# Patient Record
Sex: MALE
[De-identification: names, ages, dates, MRNs, and addresses within clinical notes are randomized per-mention and may not be internally consistent; named-entity substitution may affect disease eponyms.]

---

## 2020-09-17 ENCOUNTER — RESULT REVIEW (OUTPATIENT)
Age: 40
End: 2020-09-17

## 2020-09-17 ENCOUNTER — OUTPATIENT (OUTPATIENT)
Dept: OUTPATIENT SERVICES | Facility: HOSPITAL | Age: 40
LOS: 1 days | End: 2020-09-17
Payer: COMMERCIAL

## 2020-09-17 ENCOUNTER — APPOINTMENT (OUTPATIENT)
Dept: CT IMAGING | Facility: IMAGING CENTER | Age: 40
End: 2020-09-17
Payer: COMMERCIAL

## 2020-09-17 ENCOUNTER — APPOINTMENT (OUTPATIENT)
Dept: UROLOGY | Facility: CLINIC | Age: 40
End: 2020-09-17
Payer: COMMERCIAL

## 2020-09-17 VITALS
HEIGHT: 74 IN | DIASTOLIC BLOOD PRESSURE: 93 MMHG | BODY MASS INDEX: 25.67 KG/M2 | HEART RATE: 80 BPM | SYSTOLIC BLOOD PRESSURE: 155 MMHG | WEIGHT: 200 LBS | RESPIRATION RATE: 16 BRPM

## 2020-09-17 DIAGNOSIS — N41.1 CHRONIC PROSTATITIS: ICD-10-CM

## 2020-09-17 DIAGNOSIS — Z80.9 FAMILY HISTORY OF MALIGNANT NEOPLASM, UNSPECIFIED: ICD-10-CM

## 2020-09-17 DIAGNOSIS — R10.9 UNSPECIFIED ABDOMINAL PAIN: ICD-10-CM

## 2020-09-17 DIAGNOSIS — K40.90 UNILATERAL INGUINAL HERNIA, W/OUT OBSTRUCTION OR GANGRENE, NOT SPECIFIED AS RECURRENT: ICD-10-CM

## 2020-09-17 DIAGNOSIS — Z78.9 OTHER SPECIFIED HEALTH STATUS: ICD-10-CM

## 2020-09-17 LAB
APPEARANCE: CLEAR
BACTERIA: NEGATIVE
BILIRUBIN URINE: NEGATIVE
BLOOD URINE: NEGATIVE
COLOR: COLORLESS
GLUCOSE QUALITATIVE U: NEGATIVE
HYALINE CASTS: 0 /LPF
KETONES URINE: NEGATIVE
LEUKOCYTE ESTERASE URINE: NEGATIVE
MICROSCOPIC-UA: NORMAL
NITRITE URINE: NEGATIVE
PH URINE: 7.5
PROTEIN URINE: NEGATIVE
RED BLOOD CELLS URINE: 0 /HPF
SPECIFIC GRAVITY URINE: 1
SQUAMOUS EPITHELIAL CELLS: 0 /HPF
UROBILINOGEN URINE: NORMAL
WHITE BLOOD CELLS URINE: 0 /HPF

## 2020-09-17 PROCEDURE — 74176 CT ABD & PELVIS W/O CONTRAST: CPT

## 2020-09-17 PROCEDURE — 74176 CT ABD & PELVIS W/O CONTRAST: CPT | Mod: 26

## 2020-09-17 PROCEDURE — 99204 OFFICE O/P NEW MOD 45 MIN: CPT

## 2020-09-17 RX ORDER — SULFAMETHOXAZOLE AND TRIMETHOPRIM 400; 80 MG/1; MG/1
TABLET ORAL
Refills: 0 | Status: ACTIVE | COMMUNITY

## 2020-09-17 RX ORDER — IBUPROFEN 800 MG/1
TABLET, FILM COATED ORAL
Refills: 0 | Status: ACTIVE | COMMUNITY

## 2020-09-18 LAB
BACTERIA UR CULT: NORMAL
C TRACH RRNA SPEC QL NAA+PROBE: NOT DETECTED
N GONORRHOEA RRNA SPEC QL NAA+PROBE: NOT DETECTED
SOURCE AMPLIFICATION: NORMAL
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
## 2020-09-18 NOTE — ADDENDUM
[FreeTextEntry1] : Entered by Travis Michael, acting as scribe for Dr. Ryan Isabel.\par \par The documentation recorded by the scribe accurately reflects the service I personally performed and the decisions made by me.

## 2020-09-21 ENCOUNTER — OUTPATIENT (OUTPATIENT)
Dept: OUTPATIENT SERVICES | Facility: HOSPITAL | Age: 40
LOS: 1 days | End: 2020-09-21
Payer: COMMERCIAL

## 2020-09-21 ENCOUNTER — APPOINTMENT (OUTPATIENT)
Dept: ULTRASOUND IMAGING | Facility: IMAGING CENTER | Age: 40
End: 2020-09-21
Payer: COMMERCIAL

## 2020-09-21 DIAGNOSIS — Z00.00 ENCOUNTER FOR GENERAL ADULT MEDICAL EXAMINATION WITHOUT ABNORMAL FINDINGS: ICD-10-CM

## 2020-09-21 DIAGNOSIS — N43.3 HYDROCELE, UNSPECIFIED: ICD-10-CM

## 2020-09-21 DIAGNOSIS — K40.90 UNILATERAL INGUINAL HERNIA, WITHOUT OBSTRUCTION OR GANGRENE, NOT SPECIFIED AS RECURRENT: ICD-10-CM

## 2020-09-21 DIAGNOSIS — N41.1 CHRONIC PROSTATITIS: ICD-10-CM

## 2020-09-21 DIAGNOSIS — R10.9 UNSPECIFIED ABDOMINAL PAIN: ICD-10-CM

## 2020-09-21 PROCEDURE — 76870 US EXAM SCROTUM: CPT | Mod: 26

## 2020-09-21 PROCEDURE — 76870 US EXAM SCROTUM: CPT

## 2020-09-24 ENCOUNTER — APPOINTMENT (OUTPATIENT)
Dept: UROLOGY | Facility: CLINIC | Age: 40
End: 2020-09-24

## 2020-09-29 LAB
MYCOPLASMA HOMINIS CULTURE: NORMAL
UREA SPECIMEN SOURCE: NORMAL
UREAPLASMA CULTURE: NORMAL
UREAPLASMA, PRELIMINARY CULTURE: NORMAL

## 2020-10-05 ENCOUNTER — OUTPATIENT (OUTPATIENT)
Dept: OUTPATIENT SERVICES | Facility: HOSPITAL | Age: 40
LOS: 1 days | End: 2020-10-05
Payer: COMMERCIAL

## 2020-10-05 ENCOUNTER — APPOINTMENT (OUTPATIENT)
Dept: UROLOGY | Facility: CLINIC | Age: 40
End: 2020-10-05
Payer: COMMERCIAL

## 2020-10-05 VITALS
OXYGEN SATURATION: 97 % | DIASTOLIC BLOOD PRESSURE: 80 MMHG | SYSTOLIC BLOOD PRESSURE: 148 MMHG | HEART RATE: 67 BPM | RESPIRATION RATE: 14 BRPM

## 2020-10-05 VITALS — TEMPERATURE: 97.4 F

## 2020-10-05 DIAGNOSIS — R35.0 FREQUENCY OF MICTURITION: ICD-10-CM

## 2020-10-05 PROCEDURE — 88112 CYTOPATH CELL ENHANCE TECH: CPT | Mod: 26

## 2020-10-05 PROCEDURE — 55000 DRAINAGE OF HYDROCELE: CPT

## 2020-10-05 PROCEDURE — 88305 TISSUE EXAM BY PATHOLOGIST: CPT | Mod: 26

## 2020-10-05 NOTE — ADDENDUM
[FreeTextEntry1] : Entered by Rocco Guerrero, acting as scribe for Dr. Ryan Isabel.\par \par The documentation recorded by the scribe accurately reflects the service I personally performed and the decisions made by me.\par

## 2020-10-05 NOTE — LETTER BODY
[FreeTextEntry1] : Thong Helms MD\par [address], [address]\par [facility] [address]\par [phone]\par \par \par Dear Dr. Helms,\par \par Reason for Visit: Left testicular discomfort. Left hydrocele. \par \par This is a 40 year-old single  complaining of left testicular discomfort. His recent testicular ultrasound at [facility] demonstrated bilateral hydroceles, trace/small on the right and large on the left. Patient returns today for aspiration of left hydrocele. Since his last visit, the patient reports that his pain has decreased, but he still wants to proceed with aspiration. Patient denies any gross hematuria or dysuria or urinary incontinence. The patient denies any interference of function. All other review of systems are negative. Past medical history, family history and social history were unchanged. Medications and allergies were reviewed. He has no known allergies to medication. \par \par On examination, the patient is a healthy-appearing gentleman in no acute distress. He is alert and oriented follows commands. He has normal mood and affect. He is normocephalic. Oral no thrush. Neck is supple. Respirations are unlabored. His abdomen is soft and nontender. Liver is nonpalpable. Bladder is nonpalpable. No CVA tenderness. Neurologically he is grossly intact. No peripheral edema. Skin without gross abnormality. He has normal male external genitalia. Normal meatus.He has enlarged left hemiscrotum. On rectal examination, there is normal sphincter tone. The prostate is clinically benign without focal induration or nodularity. He has left scrotal swelling.\par \par The patient was prepped and draped in standard fashion. The scrotal US confirmed the left hydrocele. The skin was anesthetized with 1 cc of 1% lidocaine. Using a 20 gauge angiocatheter the left hydrocele sac was punctured. Approximately 300 cc of clear fluid was removed. No evidence of bleeding. Dry dressing was applied. No complications.\par \par The patient's ureaplasma/mycoplasma genital culture and chlamydia/GC amplification were unremarkable. His urine culture was negative. His urinalysis was unremarkable.\par \par Assessment: Left testicular discomfort. Left hydrocele. \par \par I counseled the patient. I reassured the patient, as his urine culture and urinalysis did not demonstrate evidence of infection. He underwent uneventful left hydrocele aspiration today. He is doing well. Patient understands that if he develops gross hematuria or any urinary discomfort, he will contact me for further evaluation. Risks and alternatives were discussed. I answered the patient questions. The patient will follow-up as directed and will contact me with any questions or concerns. Thank you for the opportunity to participate in the care of Mr. PRASAD. I will keep you updated on his progress.\par \par Plan: Follow up as directed.

## 2020-10-07 LAB — OTHER FLUID CYTOLOGY: NORMAL

## 2020-10-08 DIAGNOSIS — N41.1 CHRONIC PROSTATITIS: ICD-10-CM

## 2020-10-08 DIAGNOSIS — N43.3 HYDROCELE, UNSPECIFIED: ICD-10-CM

## 2020-10-08 DIAGNOSIS — R10.9 UNSPECIFIED ABDOMINAL PAIN: ICD-10-CM

## 2020-10-22 ENCOUNTER — APPOINTMENT (OUTPATIENT)
Dept: UROLOGY | Facility: CLINIC | Age: 40
End: 2020-10-22
Payer: COMMERCIAL

## 2020-10-22 VITALS — TEMPERATURE: 97.6 F

## 2020-10-22 DIAGNOSIS — Z00.00 ENCOUNTER FOR GENERAL ADULT MEDICAL EXAMINATION W/OUT ABNORMAL FINDINGS: ICD-10-CM

## 2020-10-22 DIAGNOSIS — R10.9 UNSPECIFIED ABDOMINAL PAIN: ICD-10-CM

## 2020-10-22 DIAGNOSIS — N43.3 HYDROCELE, UNSPECIFIED: ICD-10-CM

## 2020-10-22 DIAGNOSIS — N41.1 CHRONIC PROSTATITIS: ICD-10-CM

## 2020-10-22 PROCEDURE — 99213 OFFICE O/P EST LOW 20 MIN: CPT

## 2020-10-22 PROCEDURE — 99072 ADDL SUPL MATRL&STAF TM PHE: CPT

## 2020-10-22 NOTE — LETTER BODY
[FreeTextEntry1] : Thong Helms MD\par [address]\par [address]\par [facility] [address]\par [phone]\par \par Dear Dr. Helms,\par \par Reason for Visit: Left testicular discomfort. Left hydrocele s/p aspiration.\par \par This is a 40 year-old single  complaining of left testicular discomfort and left hydrocele status post previous aspiration. His testicular ultrasound at [facility] in September demonstrated bilateral hydroceles, trace/small on the right and large on the left. He underwent left hydrocele aspiration about two weeks ago. He returns today for follow-up. Since the patient's last visit, he reports that his testicular pain has improved. However, he has significant anxiety regarding his condition and he is interested in further evaluation. The patient complains of fatigue, facial swelling ,and non-specific discomfort. Patient denies any gross hematuria or dysuria or urinary incontinence. The patient denies any interference of function. All other review of systems are negative. Past medical history, family history and social history were unchanged. Medications and allergies were reviewed. He has no known allergies to medication.\par \par On examination, the patient is a healthy-appearing gentleman in no acute distress. He is alert and oriented follows commands. He has normal mood and affect. He is normocephalic. Oral no thrush. Neck is supple. Respirations are unlabored. His abdomen is soft and nontender. Liver is nonpalpable. Bladder is nonpalpable. No CVA tenderness. Neurologically he is grossly intact. No peripheral edema. Skin without gross abnormality. He has normal male external genitalia. Normal meatus.He has enlarged left hemiscrotum. On rectal examination, there is normal sphincter tone. The prostate is clinically benign without focal induration or nodularity.\par \par His fluid cytology was negative for malignant cells.\par \par Assessment: Left testicular discomfort. Left hydrocele s/p aspiration.\par \par I counseled the patient. I reassured the patient. I discussed with him that his fluid cytology was negative for malignant cells. He is doing well. The patient has significant anxiety about his condition. I recommended he see PCP to evaluate for other conditions. I also encouraged him to see Dr. Parikh for further evaluation regarding surgery. Risks and alternatives were discussed. I answered the patient questions. The patient will follow-up as directed and will contact me with any questions or concerns. Thank you for the opportunity to participate in the care of Mr. PRASAD. I will keep you updated on his progress.\par \par Plan: See PCP. See Dr. Parikh. Follow up as directed.

## 2020-10-24 LAB
APPEARANCE: CLEAR
BACTERIA UR CULT: NORMAL
BACTERIA: NEGATIVE
BILIRUBIN URINE: NEGATIVE
BLOOD URINE: NEGATIVE
COLOR: NORMAL
GLUCOSE QUALITATIVE U: NEGATIVE
HYALINE CASTS: 0 /LPF
KETONES URINE: NEGATIVE
LEUKOCYTE ESTERASE URINE: NEGATIVE
MICROSCOPIC-UA: NORMAL
NITRITE URINE: NEGATIVE
PH URINE: 8
PROTEIN URINE: NEGATIVE
RED BLOOD CELLS URINE: 1 /HPF
SPECIFIC GRAVITY URINE: 1.01
SQUAMOUS EPITHELIAL CELLS: 0 /HPF
UROBILINOGEN URINE: NORMAL
WHITE BLOOD CELLS URINE: 0 /HPF

## 2023-03-16 ENCOUNTER — OFFICE (OUTPATIENT)
Dept: URBAN - METROPOLITAN AREA CLINIC 97 | Facility: CLINIC | Age: 43
Setting detail: OPHTHALMOLOGY
End: 2023-03-16
Payer: COMMERCIAL

## 2023-03-16 DIAGNOSIS — H43.813: ICD-10-CM

## 2023-03-16 DIAGNOSIS — H25.13: ICD-10-CM

## 2023-03-16 PROCEDURE — 92004 COMPRE OPH EXAM NEW PT 1/>: CPT | Performed by: OPHTHALMOLOGY

## 2023-03-16 ASSESSMENT — REFRACTION_MANIFEST
OD_AXIS: 175
OU_VA: 20/15
OD_VA1: 20/15
OD_SPHERE: -2.50
OS_SPHERE: PLANO
OS_VA1: 20/15
OD_CYLINDER: +0.75

## 2023-03-16 ASSESSMENT — KERATOMETRY
OD_K2POWER_DIOPTERS: 41.75
OD_K1POWER_DIOPTERS: 41.00
OS_AXISANGLE_DEGREES: 007
OS_K2POWER_DIOPTERS: 42.25
OD_AXISANGLE_DEGREES: 007
OS_K1POWER_DIOPTERS: 41.00
METHOD_AUTO_MANUAL: AUTO

## 2023-03-16 ASSESSMENT — REFRACTION_AUTOREFRACTION
OD_AXIS: 174
OS_AXIS: 003
OD_CYLINDER: +1.00
OS_SPHERE: -0.50
OD_SPHERE: -2.75
OS_CYLINDER: +1.00

## 2023-03-16 ASSESSMENT — SPHEQUIV_DERIVED
OS_SPHEQUIV: 0
OD_SPHEQUIV: -2.25
OD_SPHEQUIV: -2.125

## 2023-03-16 ASSESSMENT — TONOMETRY
OS_IOP_MMHG: 14
OD_IOP_MMHG: 14

## 2023-03-16 ASSESSMENT — AXIALLENGTH_DERIVED
OD_AL: 25.318
OD_AL: 25.3742
OS_AL: 24.3013

## 2023-03-16 ASSESSMENT — CONFRONTATIONAL VISUAL FIELD TEST (CVF)
OS_FINDINGS: FULL
OD_FINDINGS: FULL

## 2023-03-16 ASSESSMENT — VISUAL ACUITY
OD_BCVA: 20/15
OS_BCVA: 20/150

## 2024-05-17 ENCOUNTER — OFFICE (OUTPATIENT)
Dept: URBAN - METROPOLITAN AREA CLINIC 8 | Facility: CLINIC | Age: 44
Setting detail: OPHTHALMOLOGY
End: 2024-05-17
Payer: COMMERCIAL

## 2024-05-17 DIAGNOSIS — H47.322: ICD-10-CM

## 2024-05-17 DIAGNOSIS — H43.813: ICD-10-CM

## 2024-05-17 DIAGNOSIS — H25.13: ICD-10-CM

## 2024-05-17 PROCEDURE — 92014 COMPRE OPH EXAM EST PT 1/>: CPT | Performed by: OPTOMETRIST

## 2024-05-17 ASSESSMENT — CONFRONTATIONAL VISUAL FIELD TEST (CVF)
OD_FINDINGS: FULL
OS_FINDINGS: FULL

## 2024-05-29 ENCOUNTER — OFFICE (OUTPATIENT)
Dept: URBAN - METROPOLITAN AREA CLINIC 8 | Facility: CLINIC | Age: 44
Setting detail: OPHTHALMOLOGY
End: 2024-05-29
Payer: COMMERCIAL

## 2024-05-29 DIAGNOSIS — H25.13: ICD-10-CM

## 2024-05-29 DIAGNOSIS — H52.13: ICD-10-CM

## 2024-05-29 DIAGNOSIS — H47.322: ICD-10-CM

## 2024-05-29 DIAGNOSIS — H43.813: ICD-10-CM

## 2024-05-29 PROCEDURE — 92015 DETERMINE REFRACTIVE STATE: CPT | Performed by: OPHTHALMOLOGY

## 2024-05-29 PROCEDURE — 99213 OFFICE O/P EST LOW 20 MIN: CPT | Performed by: OPHTHALMOLOGY

## 2024-05-29 ASSESSMENT — CONFRONTATIONAL VISUAL FIELD TEST (CVF)
OD_FINDINGS: FULL
OS_FINDINGS: FULL

## 2025-08-08 ENCOUNTER — OFFICE (OUTPATIENT)
Dept: URBAN - METROPOLITAN AREA CLINIC 8 | Facility: CLINIC | Age: 45
Setting detail: OPHTHALMOLOGY
End: 2025-08-08
Payer: COMMERCIAL

## 2025-08-08 DIAGNOSIS — H47.322: ICD-10-CM

## 2025-08-08 DIAGNOSIS — H43.813: ICD-10-CM

## 2025-08-08 DIAGNOSIS — H25.13: ICD-10-CM

## 2025-08-08 PROBLEM — H52.223 ASTIGMATISM, REGULAR; BOTH EYES: Status: ACTIVE | Noted: 2025-08-08

## 2025-08-08 PROCEDURE — 92014 COMPRE OPH EXAM EST PT 1/>: CPT | Performed by: OPTOMETRIST

## 2025-08-08 ASSESSMENT — CONFRONTATIONAL VISUAL FIELD TEST (CVF)
OD_FINDINGS: FULL
OS_FINDINGS: FULL

## 2025-08-08 ASSESSMENT — REFRACTION_MANIFEST
OS_SPHERE: PLANO
OS_AXIS: 090
OS_AXIS: 090
OD_AXIS: 085
OS_VA1: 20/20
OS_CYLINDER: -0.50
OD_CYLINDER: -1.50
OD_SPHERE: -1.50
OU_VA: 20/20
OS_CYLINDER: -1.25
OD_AXIS: 085
OD_SPHERE: -1.50
OD_VA1: 20/20-
OD_CYLINDER: -1.25
OD_VA1: 20/30+
OS_VA1: 20/20
OU_VA: 20/20
OS_SPHERE: +0.75

## 2025-08-08 ASSESSMENT — REFRACTION_AUTOREFRACTION
OS_AXIS: 089
OD_AXIS: 084
OS_CYLINDER: -1.25
OS_SPHERE: +0.75
OD_SPHERE: -1.50
OD_CYLINDER: -1.50

## 2025-08-08 ASSESSMENT — VISUAL ACUITY
OS_BCVA: 20/150
OD_BCVA: 20/30

## 2025-08-08 ASSESSMENT — KERATOMETRY
OS_K2POWER_DIOPTERS: 42.25
OD_K2POWER_DIOPTERS: 41.75
OS_K1POWER_DIOPTERS: 41.00
OD_K1POWER_DIOPTERS: 40.75
OS_AXISANGLE_DEGREES: 006
METHOD_AUTO_MANUAL: AUTO
OD_AXISANGLE_DEGREES: 007